# Patient Record
Sex: FEMALE | URBAN - METROPOLITAN AREA
[De-identification: names, ages, dates, MRNs, and addresses within clinical notes are randomized per-mention and may not be internally consistent; named-entity substitution may affect disease eponyms.]

---

## 2017-07-13 ENCOUNTER — APPOINTMENT (RX ONLY)
Dept: URBAN - METROPOLITAN AREA CLINIC 2 | Facility: CLINIC | Age: 37
Setting detail: DERMATOLOGY
End: 2017-07-13

## 2017-07-13 DIAGNOSIS — B07.0 PLANTAR WART: ICD-10-CM

## 2017-07-13 DIAGNOSIS — Z80.8 FAMILY HISTORY OF MALIGNANT NEOPLASM OF OTHER ORGANS OR SYSTEMS: ICD-10-CM

## 2017-07-13 DIAGNOSIS — D22 MELANOCYTIC NEVI: ICD-10-CM

## 2017-07-13 DIAGNOSIS — L98.8 OTHER SPECIFIED DISORDERS OF THE SKIN AND SUBCUTANEOUS TISSUE: ICD-10-CM

## 2017-07-13 DIAGNOSIS — Z71.89 OTHER SPECIFIED COUNSELING: ICD-10-CM

## 2017-07-13 PROBLEM — H91.90 UNSPECIFIED HEARING LOSS, UNSPECIFIED EAR: Status: ACTIVE | Noted: 2017-07-13

## 2017-07-13 PROBLEM — D22.62 MELANOCYTIC NEVI OF LEFT UPPER LIMB, INCLUDING SHOULDER: Status: ACTIVE | Noted: 2017-07-13

## 2017-07-13 PROBLEM — L85.3 XEROSIS CUTIS: Status: ACTIVE | Noted: 2017-07-13

## 2017-07-13 PROCEDURE — 99213 OFFICE O/P EST LOW 20 MIN: CPT

## 2017-07-13 PROCEDURE — ? DEFER

## 2017-07-13 PROCEDURE — ? TREATMENT REGIMEN

## 2017-07-13 PROCEDURE — ? COUNSELING

## 2017-07-13 PROCEDURE — ? OBSERVATION

## 2017-07-13 ASSESSMENT — LOCATION SIMPLE DESCRIPTION DERM
LOCATION SIMPLE: LEFT GREAT TOE
LOCATION SIMPLE: RIGHT PLANTAR SURFACE
LOCATION SIMPLE: LEFT PLANTAR SURFACE
LOCATION SIMPLE: LEFT SHOULDER

## 2017-07-13 ASSESSMENT — LOCATION DETAILED DESCRIPTION DERM
LOCATION DETAILED: LEFT POSTERIOR SHOULDER
LOCATION DETAILED: LEFT LATERAL PLANTAR HEEL
LOCATION DETAILED: LEFT MEDIAL GREAT TOE
LOCATION DETAILED: RIGHT MEDIAL PLANTAR HEEL

## 2017-07-13 ASSESSMENT — LOCATION ZONE DERM
LOCATION ZONE: TOE
LOCATION ZONE: FEET
LOCATION ZONE: ARM

## 2017-07-13 NOTE — PROCEDURE: TREATMENT REGIMEN
Detail Level: Detailed
Initiate Treatment: Apply duct tape to R heel for 2 days, and reapply fresh tape covering only the wart.
Initiate Treatment: Apply a liquid bandaid to prevent infection

## 2017-07-13 NOTE — HPI: OTHER
Condition:: Family history of melanoma
Please Describe Your Condition:: Patient here for a skin exam.

## 2017-11-10 ENCOUNTER — APPOINTMENT (RX ONLY)
Dept: URBAN - METROPOLITAN AREA CLINIC 2 | Facility: CLINIC | Age: 37
Setting detail: DERMATOLOGY
End: 2017-11-10

## 2017-11-10 DIAGNOSIS — D485 NEOPLASM OF UNCERTAIN BEHAVIOR OF SKIN: ICD-10-CM

## 2017-11-10 DIAGNOSIS — B07.0 PLANTAR WART: ICD-10-CM

## 2017-11-10 PROBLEM — D48.5 NEOPLASM OF UNCERTAIN BEHAVIOR OF SKIN: Status: ACTIVE | Noted: 2017-11-10

## 2017-11-10 PROCEDURE — ? LIQUID NITROGEN

## 2017-11-10 PROCEDURE — ? COUNSELING

## 2017-11-10 PROCEDURE — 99212 OFFICE O/P EST SF 10 MIN: CPT | Mod: 25

## 2017-11-10 PROCEDURE — ? DEFER

## 2017-11-10 PROCEDURE — ? TREATMENT REGIMEN

## 2017-11-10 ASSESSMENT — LOCATION DETAILED DESCRIPTION DERM
LOCATION DETAILED: RIGHT MEDIAL PLANTAR 1ST TOE
LOCATION DETAILED: LEFT CHIN
LOCATION DETAILED: RIGHT MEDIAL PLANTAR 1ST TOE
LOCATION DETAILED: LEFT MEDIAL PLANTAR HEEL

## 2017-11-10 ASSESSMENT — LOCATION SIMPLE DESCRIPTION DERM
LOCATION SIMPLE: CHIN
LOCATION SIMPLE: LEFT PLANTAR SURFACE
LOCATION SIMPLE: PLANTAR SURFACE OF RIGHT 1ST TOE
LOCATION SIMPLE: PLANTAR SURFACE OF RIGHT 1ST TOE

## 2017-11-10 ASSESSMENT — LOCATION ZONE DERM
LOCATION ZONE: FACE
LOCATION ZONE: FEET
LOCATION ZONE: TOE
LOCATION ZONE: TOE

## 2017-11-10 NOTE — PROCEDURE: LIQUID NITROGEN
Medical Necessity Clause: This procedure was medically necessary because the lesions that were treated were:
Render Post-Care Instructions In Note?: no
Post-Care Instructions: I reviewed with the patient in detail post-care instructions. Patient is to wear sunprotection, and avoid picking at any of the treated lesions. Pt may apply Vaseline to crusted or scabbing areas.
Medical Necessity Information: It is in your best interest to select a reason for this procedure from the list below. All of these items fulfill various CMS LCD requirements except the new and changing color options.
Consent: The patient's consent was obtained including but not limited to risks of crusting, scabbing, blistering, scarring, darker or lighter pigmentary change, recurrence, incomplete removal and infection.
Detail Level: Detailed
Include Z78.9 (Other Specified Conditions Influencing Health Status) As An Associated Diagnosis?: Yes

## 2017-11-10 NOTE — PROCEDURE: TREATMENT REGIMEN
Detail Level: Detailed
Otc Regimen: Begin using a topical acid on large wart, daily under a bandaid.

## 2017-12-01 ENCOUNTER — APPOINTMENT (RX ONLY)
Dept: URBAN - METROPOLITAN AREA CLINIC 2 | Facility: CLINIC | Age: 37
Setting detail: DERMATOLOGY
End: 2017-12-01

## 2017-12-01 DIAGNOSIS — B07.0 PLANTAR WART: ICD-10-CM

## 2017-12-01 PROCEDURE — 99212 OFFICE O/P EST SF 10 MIN: CPT

## 2017-12-01 PROCEDURE — ? LIQUID NITROGEN

## 2017-12-01 PROCEDURE — ? COUNSELING

## 2017-12-01 PROCEDURE — ? TREATMENT REGIMEN

## 2017-12-01 ASSESSMENT — LOCATION ZONE DERM
LOCATION ZONE: TOE
LOCATION ZONE: FEET

## 2017-12-01 ASSESSMENT — LOCATION SIMPLE DESCRIPTION DERM
LOCATION SIMPLE: RIGHT PLANTAR SURFACE
LOCATION SIMPLE: LEFT GREAT TOE
LOCATION SIMPLE: PLANTAR SURFACE OF LEFT 1ST TOE

## 2017-12-01 ASSESSMENT — LOCATION DETAILED DESCRIPTION DERM
LOCATION DETAILED: RIGHT LATERAL PLANTAR HEEL
LOCATION DETAILED: LEFT MEDIAL PLANTAR 1ST TOE
LOCATION DETAILED: LEFT MEDIAL GREAT TOE
LOCATION DETAILED: RIGHT MEDIAL PLANTAR HEEL

## 2017-12-08 ENCOUNTER — APPOINTMENT (RX ONLY)
Dept: URBAN - METROPOLITAN AREA CLINIC 2 | Facility: CLINIC | Age: 37
Setting detail: DERMATOLOGY
End: 2017-12-08

## 2017-12-08 DIAGNOSIS — B07.0 PLANTAR WART: ICD-10-CM

## 2017-12-08 PROCEDURE — 99212 OFFICE O/P EST SF 10 MIN: CPT | Mod: 24

## 2017-12-08 PROCEDURE — ? PRESCRIPTION

## 2017-12-08 PROCEDURE — ? COUNSELING

## 2017-12-08 RX ORDER — CEFADROXIL 500 MG/1
CAPSULE ORAL BID
Qty: 20 | Refills: 0 | Status: ERX | COMMUNITY
Start: 2017-12-08

## 2017-12-08 RX ADMIN — CEFADROXIL: 500 CAPSULE ORAL at 22:01

## 2021-07-30 NOTE — PROCEDURE: DEFER
From: Sigifredo Montana  To: Jarrett Beth  Sent: 7/30/2021 11:47 AM CDT  Subject: Medication Question    Would Dr. Beth write me a prescription for hydrocodone as i am having some significant back pain.   Thanks  Venkat Montana  [phone]  
Pt advised of appt needed.  Please schedule.  Ok to double book if needed.  
Pt scheduled  
Detail Level: Detailed
Instructions (Optional): Will wait for growth and a more definitive diagnosis, then poss. treat with liquid nitrogen if a seb k.
Procedure To Be Performed At Next Visit: Cryotherapy